# Patient Record
Sex: MALE | Race: WHITE
[De-identification: names, ages, dates, MRNs, and addresses within clinical notes are randomized per-mention and may not be internally consistent; named-entity substitution may affect disease eponyms.]

---

## 2020-04-25 ENCOUNTER — HOSPITAL ENCOUNTER (EMERGENCY)
Dept: HOSPITAL 57 - BURERS | Age: 70
Discharge: HOME | End: 2020-04-25
Payer: COMMERCIAL

## 2020-04-25 DIAGNOSIS — Z79.899: ICD-10-CM

## 2020-04-25 DIAGNOSIS — S81.812A: Primary | ICD-10-CM
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
## 2020-04-25 PROCEDURE — 90715 TDAP VACCINE 7 YRS/> IM: CPT

## 2020-04-25 PROCEDURE — 12001 RPR S/N/AX/GEN/TRNK 2.5CM/<: CPT

## 2020-04-25 PROCEDURE — 90471 IMMUNIZATION ADMIN: CPT

## 2020-04-25 NOTE — RAD
LEFT LEG TWO VIEWS:

 

Date: 4-

 

FINDINGS: 

No fracture or opaque foreign body was seen. The bones appear intact. 

 

IMPRESSION: 

No acute findings.

 

POS: HOME